# Patient Record
Sex: FEMALE | URBAN - METROPOLITAN AREA
[De-identification: names, ages, dates, MRNs, and addresses within clinical notes are randomized per-mention and may not be internally consistent; named-entity substitution may affect disease eponyms.]

---

## 2021-02-16 ENCOUNTER — APPOINTMENT (RX ONLY)
Dept: URBAN - METROPOLITAN AREA CLINIC 167 | Facility: CLINIC | Age: 31
Setting detail: DERMATOLOGY
End: 2021-02-16

## 2021-02-16 DIAGNOSIS — Z41.9 ENCOUNTER FOR PROCEDURE FOR PURPOSES OTHER THAN REMEDYING HEALTH STATE, UNSPECIFIED: ICD-10-CM

## 2021-02-16 PROCEDURE — ? MEDICAL CONSULTATION: DYSPORT

## 2021-02-16 PROCEDURE — ? DYSPORT

## 2021-02-16 PROCEDURE — ? ADDITIONAL NOTES

## 2021-02-16 PROCEDURE — ? MEDICAL CONSULTATION: DYNAMIC RHYTIDES

## 2021-02-16 ASSESSMENT — LOCATION DETAILED DESCRIPTION DERM
LOCATION DETAILED: LEFT FOREHEAD
LOCATION DETAILED: LEFT MEDIAL FOREHEAD
LOCATION DETAILED: RIGHT FOREHEAD
LOCATION DETAILED: GLABELLA
LOCATION DETAILED: RIGHT CENTRAL EYEBROW
LOCATION DETAILED: LEFT MEDIAL EYEBROW
LOCATION DETAILED: LEFT SUPERIOR LATERAL MALAR CHEEK
LOCATION DETAILED: LEFT CENTRAL EYEBROW
LOCATION DETAILED: RIGHT INFERIOR TEMPLE
LOCATION DETAILED: RIGHT MEDIAL EYEBROW
LOCATION DETAILED: SUPERIOR MID FOREHEAD

## 2021-02-16 ASSESSMENT — LOCATION SIMPLE DESCRIPTION DERM
LOCATION SIMPLE: GLABELLA
LOCATION SIMPLE: RIGHT EYEBROW
LOCATION SIMPLE: LEFT FOREHEAD
LOCATION SIMPLE: SUPERIOR FOREHEAD
LOCATION SIMPLE: LEFT CHEEK
LOCATION SIMPLE: RIGHT FOREHEAD
LOCATION SIMPLE: LEFT EYEBROW
LOCATION SIMPLE: RIGHT TEMPLE

## 2021-02-16 ASSESSMENT — LOCATION ZONE DERM: LOCATION ZONE: FACE

## 2021-02-16 NOTE — PROCEDURE: ADDITIONAL NOTES
Additional Notes: The patient is present due to concerns of static lines of the forehead, glabella, and periorbital region. The patient stated that she received botox 2 years ago prior to child birth. The patient now states that she is not breastfeeding, currently pregnant or planing to become pregnant. The patient stated she would like further education on the difference of Botox and Dysport. The patient was informed that Botox and Dysport are neurotoxins that act as temporary muscle relaxers preventing dynamic movements responsible for static lines. The patient was quoted $13/unit Botox and $13/unit Dysport. The patient elected to receive Dysport. The patient was informed that she is in need of 40 units of Dysport for treatment. The patient was informed that she has oily skin and is in need of a medical grade skin care regimen. The patient was advised to initiate use of R/E acne kit to modulate sebum control. The patient was informed of February product special 20% off select product kits.
Render Risk Assessment In Note?: no
Detail Level: Detailed

## 2021-02-16 NOTE — PROCEDURE: DYSPORT
Price (Use Numbers Only, No Special Characters Or $): 527 Price (Use Numbers Only, No Special Characters Or $): 930